# Patient Record
Sex: FEMALE | Race: WHITE | NOT HISPANIC OR LATINO | URBAN - NONMETROPOLITAN AREA
[De-identification: names, ages, dates, MRNs, and addresses within clinical notes are randomized per-mention and may not be internally consistent; named-entity substitution may affect disease eponyms.]

---

## 2024-08-15 ENCOUNTER — OFFICE (OUTPATIENT)
Dept: URBAN - NONMETROPOLITAN AREA CLINIC 1 | Facility: CLINIC | Age: 69
End: 2024-08-15
Payer: MEDICARE

## 2024-08-15 VITALS
HEIGHT: 63 IN | SYSTOLIC BLOOD PRESSURE: 125 MMHG | DIASTOLIC BLOOD PRESSURE: 91 MMHG | HEART RATE: 83 BPM | WEIGHT: 152 LBS

## 2024-08-15 DIAGNOSIS — K21.9 GASTRO-ESOPHAGEAL REFLUX DISEASE WITHOUT ESOPHAGITIS: ICD-10-CM

## 2024-08-15 DIAGNOSIS — R13.10 DYSPHAGIA, UNSPECIFIED: ICD-10-CM

## 2024-08-15 DIAGNOSIS — K59.09 OTHER CONSTIPATION: ICD-10-CM

## 2024-08-15 PROCEDURE — 99204 OFFICE O/P NEW MOD 45 MIN: CPT | Performed by: NURSE PRACTITIONER

## 2024-08-15 RX ORDER — PANTOPRAZOLE SODIUM 40 MG/1
40 TABLET, DELAYED RELEASE ORAL
Qty: 90 | Refills: 2 | Status: ACTIVE
Start: 2024-08-15

## 2024-08-15 NOTE — SERVICENOTES
Risks of procedure explained to patient and she wishes to proceed
EGD w/ dil for chronic gerd not controlled with ppi and H2 blocker, dysphagia to r/o stricture, PUD, Edward;s, 
Will stop omeprazole and start panto as above for GERD and continue Pepcid 40mg po qhs for night time reflux
Trial Trulance 3mg po qd for constipation- unfortunately, no samples of Trulance availabel in office today, call next week to check, if samples available-ok to rtc for samples.
Will request last egd and colon Dr. Valverde and after this is obtained and reviewed, will update patient screening colonoscopy recall date.
Start Metamucil otc daily for constipation as well
Stop Ibuprofen and all NSAIDs

## 2024-08-15 NOTE — SERVICEHPINOTES
Patient with a history of thoracic aorta aneurysm, GERD, DMII, thyroid disease, HLD presents to the clinic today for worsening GERD.  She complains of worsening acid reflux, indigestion and dysphagia even while taking Omeprazole 40mg po qd and famotidine 40mg qhs without relief.  Also c/o cervical dysphagia with foods and pills.  She is a poor historian in that she is not sure when her last EGD was or if Dr. Lerner performed this at the same time she performed her last colonoscopy (no report to review).  Takes Ibuprofen qhs for pain.  Denies melena, hematochezia, hematemesis, n/v/, unintentional weight loss, change in BM, or fever.  SHe has a good appetite.  Denies cardiac stents, anticoagulation therapy, ckd, or supplemental oxygenation use.  Denies a family history of crc or tobacco use.  br
br  She has IBS that will alternate from constipation to diarrhea.  She has only had 1 BM this week strains with defecation, does not take stool softeners or otc remedies for constipation.  She cannot tolerate Linzess too powerful.  Her last colonoscopy was by Dr. Geller in [place], [place] approximately 3 years ago (no report to review, per pt report-poor historian).  Previous to this, colonoscopy by Dr. Aguilar in 2011 revealed HP transverse colon polyp.  Of note, upon further record review, it was incidentally found patient had CT THorax that revealed a 4.4cm thoracic aorta aneurysm- Spoke with Per Dr. Montaño personally today regarding this TAA, she reports this CT scan is a f/u of known TAA from 3/34-no change and she is managing-also confirmed patient is not on anticoagulation therapy or supplemental home O2.br

br colonoscopy by Dr Geller recently in the last 3 years[place] br[place]onoscopy by Dr Aguilar 2011
brsingle polyp in transverse colon removed, moderately to severe diverticulosis sigmoid colon, normal terminal ileum. brDIAGNOSIS:br MICROSCOPIC:br  TRANSVERSE COLON POLYP:br  Hyperplastic polyp.
br
brCT Thorax 7/2024brFINDINGS:brIn the lingular segment there is a questionable semi-solid nodule brmeasuring 10 mm not seen on the previous exam. No other pulmonary brnodule, focal area of consolidation or pleural effusion. Central brairways are patent. No significant mediastinal mass, hilar, brsupraclavicular or axillary lymphadenopathy. Mediastinal vascular brstructures are patent. The ascending thoracic aorta measures 4.5 x br4.6 cm in AP and transverse dimension unchanged in size since the brprevious exam. No significant coronary artery calcification. brThyroid gland is small and homogeneous.brOn images of the upper abdomen there is diffuse fatty infiltration of brthe liver.brOn review of bone windows, there are degenerative changes throughout brthe thoracic spine. There is a mild compression fracture of T12 brvertebral body age indeterminate. No change since 03/20/2024.brIMPRESSION:brQuestionable semi solid 10 mm nodule in the lingular segment left brupper lobe. Follow-up CT according to Fleischner society criteria.brMild aneurysmal dilatation of the ascending thoracic aorta stable and brunchanged since 03/20/2024. Continued CT surveillance is recommended.brMild diffuse fatty infiltration of the liver.[id number] Fleischner Society Pulmonary Nodule Recommendations for brfollow-up and management of nodules smaller than 8 mm detected brincidentally during non-screening CT Scan.brIn the case of multiple nodules, use the most suspicious nodule as brguide to management.brFollow-up intervals may vary according to nodule size, location, and brindividual patient risk factors (recommendation 2A).brSingle solid >8 mm. Low Risk and High Risk, CT at 3 months, PET/CT or brtissue sampling.